# Patient Record
Sex: MALE | Race: WHITE | Employment: FULL TIME | URBAN - METROPOLITAN AREA
[De-identification: names, ages, dates, MRNs, and addresses within clinical notes are randomized per-mention and may not be internally consistent; named-entity substitution may affect disease eponyms.]

---

## 2017-12-05 ENCOUNTER — APPOINTMENT (OUTPATIENT)
Dept: GENERAL RADIOLOGY | Age: 47
End: 2017-12-05
Attending: EMERGENCY MEDICINE
Payer: COMMERCIAL

## 2017-12-05 ENCOUNTER — HOSPITAL ENCOUNTER (OUTPATIENT)
Age: 47
Discharge: HOME | End: 2017-12-05
Attending: EMERGENCY MEDICINE
Payer: COMMERCIAL

## 2017-12-05 VITALS
OXYGEN SATURATION: 99 % | DIASTOLIC BLOOD PRESSURE: 94 MMHG | TEMPERATURE: 98 F | WEIGHT: 220 LBS | RESPIRATION RATE: 16 BRPM | HEIGHT: 68 IN | HEART RATE: 78 BPM | BODY MASS INDEX: 33.34 KG/M2 | SYSTOLIC BLOOD PRESSURE: 134 MMHG

## 2017-12-05 DIAGNOSIS — S62.515A NONDISPLACED FRACTURE OF PROXIMAL PHALANX OF LEFT THUMB, INITIAL ENCOUNTER FOR CLOSED FRACTURE: Primary | ICD-10-CM

## 2017-12-05 PROCEDURE — 99203 OFFICE O/P NEW LOW 30 MIN: CPT

## 2017-12-05 PROCEDURE — 73140 X-RAY EXAM OF FINGER(S): CPT | Performed by: EMERGENCY MEDICINE

## 2017-12-05 PROCEDURE — 26720 TREAT FINGER FRACTURE EACH: CPT

## 2017-12-05 PROCEDURE — 99202 OFFICE O/P NEW SF 15 MIN: CPT

## 2017-12-05 NOTE — ED PROVIDER NOTES
Patient Seen in: [facility] Immediate Care In [address] [place] [address]    History   Patient presents with:  Upper Extremity Injury (musculoskeletal)    Stated Complaint: thumb injury    HPI    The patient is a 57-year-old male who presents with complaints of fell with a thumb spica OCL with slight flexion at the MCP and have the patient follow-up with hand orthopedics.       Disposition and Plan     Clinical Impression:  Nondisplaced fracture of proximal phalanx of left thumb, initial encounter for closed fracture

## 2017-12-05 NOTE — ED INITIAL ASSESSMENT (HPI)
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  Good radial pulse

## 2017-12-05 NOTE — ED NOTES
ocroc Moser TO T THUMB SLING FOR SUPPORT. COPY OF FILMS MADE. MD APPROVAL POST APPLICATION CMS INTACT. [facility] OFFICE CALLED AND APPOINTMENT MADE FOR TOMORROW 300. WITH DR. Steve Kilgore. PT AGREED TO APPOINTMENT.  IS TO LEAVE TOMORROW ON BUSINESS BUT

## 2018-04-06 ENCOUNTER — HOSPITAL ENCOUNTER (OUTPATIENT)
Age: 48
Discharge: HOME | End: 2018-04-06
Attending: EMERGENCY MEDICINE
Payer: COMMERCIAL

## 2018-04-06 VITALS
HEART RATE: 94 BPM | TEMPERATURE: 98 F | RESPIRATION RATE: 18 BRPM | SYSTOLIC BLOOD PRESSURE: 151 MMHG | BODY MASS INDEX: 33.34 KG/M2 | HEIGHT: 68 IN | WEIGHT: 220 LBS | OXYGEN SATURATION: 100 % | DIASTOLIC BLOOD PRESSURE: 108 MMHG

## 2018-04-06 DIAGNOSIS — S61.012A THUMB LACERATION, LEFT, INITIAL ENCOUNTER: Primary | ICD-10-CM

## 2018-04-06 PROCEDURE — 12001 RPR S/N/AX/GEN/TRNK 2.5CM/<: CPT

## 2018-04-06 PROCEDURE — 90471 IMMUNIZATION ADMIN: CPT

## 2018-04-06 PROCEDURE — 99212 OFFICE O/P EST SF 10 MIN: CPT

## 2018-04-06 PROCEDURE — 99213 OFFICE O/P EST LOW 20 MIN: CPT

## 2018-04-06 RX ORDER — GINSENG 100 MG
CAPSULE ORAL ONCE
Status: COMPLETED | OUTPATIENT
Start: 2018-04-06 | End: 2018-04-06

## 2018-04-06 NOTE — ED INITIAL ASSESSMENT (HPI)
Pt reports cutting L thumb with x-acto knife 40 mins PTA. Bleeding controlled. Does not know when had last tetanus.

## 2018-04-06 NOTE — ED PROVIDER NOTES
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   Abdominal: Soft. There is no tenderness. Musculoskeletal: Normal range of motion. He exhibits no edema. Left hand: He exhibits laceration.  He exhibits normal range of motion, no tenderness For suture removal        Medications Prescribed:  There are no discharge medications for this patient.

## 2018-04-09 ENCOUNTER — HOSPITAL ENCOUNTER (OUTPATIENT)
Age: 48
Discharge: HOME | End: 2018-04-09
Attending: EMERGENCY MEDICINE
Payer: COMMERCIAL

## 2018-04-09 VITALS
WEIGHT: 220 LBS | RESPIRATION RATE: 18 BRPM | SYSTOLIC BLOOD PRESSURE: 156 MMHG | HEIGHT: 68 IN | TEMPERATURE: 98 F | OXYGEN SATURATION: 100 % | HEART RATE: 64 BPM | DIASTOLIC BLOOD PRESSURE: 109 MMHG | BODY MASS INDEX: 33.34 KG/M2

## 2018-04-09 DIAGNOSIS — Z51.89 VISIT FOR WOUND CHECK: Primary | ICD-10-CM

## 2018-04-09 PROCEDURE — 99211 OFF/OP EST MAY X REQ PHY/QHP: CPT

## 2018-04-09 NOTE — ED PROVIDER NOTES
Patient Seen in: [facility] In [place]    History   No chief complaint on file.     Stated Complaint: wound check    HPI    Patient is a 70-year-old male who had his laceration repaired here 3 days ago he presents now for wound chec

## 2018-04-17 ENCOUNTER — HOSPITAL ENCOUNTER (OUTPATIENT)
Age: 48
Discharge: HOME | End: 2018-04-17
Attending: EMERGENCY MEDICINE
Payer: COMMERCIAL

## 2018-04-17 VITALS
HEIGHT: 68 IN | RESPIRATION RATE: 16 BRPM | OXYGEN SATURATION: 99 % | TEMPERATURE: 98 F | HEART RATE: 74 BPM | DIASTOLIC BLOOD PRESSURE: 92 MMHG | WEIGHT: 220 LBS | BODY MASS INDEX: 33.34 KG/M2 | SYSTOLIC BLOOD PRESSURE: 134 MMHG

## 2018-04-17 DIAGNOSIS — Z48.02 ENCOUNTER FOR REMOVAL OF SUTURES: Primary | ICD-10-CM

## 2018-04-17 NOTE — ED PROVIDER NOTES
Patient Seen in: [facility] Immediate Care In [place]    History   Patient presents with:  Suture Removal    Stated Complaint: Stitches Removal    HPI    The patient's a 24-year-old male who is 11 days status post suture placement on the left thu needed        Medications Prescribed:  There are no discharge medications for this patient.

## 2018-04-17 NOTE — ED INITIAL ASSESSMENT (HPI)
Pt. Here for suture removal of left thumb  Suture site without signs and/or symptoms of infection  Sutures intact